# Patient Record
Sex: FEMALE | Race: WHITE | NOT HISPANIC OR LATINO | Employment: OTHER
[De-identification: names, ages, dates, MRNs, and addresses within clinical notes are randomized per-mention and may not be internally consistent; named-entity substitution may affect disease eponyms.]

---

## 2022-04-13 ENCOUNTER — TRANSCRIBE ORDERS (OUTPATIENT)
Dept: OTHER | Age: 62
End: 2022-04-13
Payer: COMMERCIAL

## 2022-04-13 DIAGNOSIS — R93.2 ABNORMAL CT OF LIVER: ICD-10-CM

## 2022-04-13 DIAGNOSIS — R74.8 ELEVATED ALKALINE PHOSPHATASE LEVEL: Primary | ICD-10-CM

## 2022-04-28 NOTE — TELEPHONE ENCOUNTER
RECORDS RECEIVED FROM: [facility]   Appt Date: 05.11.2022   NOTES STATUS DETAILS   OFFICE NOTE from referring provider [facility] 04.11.2022 Zulma Nina, PAC     OFFICE NOTES from other specialists     DISCHARGE SUMMARY from hospital     MEDICATION LIST [facility]    LIVER BIOSPY (IF APPLICABLE)      PATHOLOGY REPORTS      IMAGING     ENDOSCOPY (IF AVAILABLE)     COLONOSCOPY (IF AVAILABLE)     ULTRASOUND LIVER     CT OF ABDOMEN [facility]  02.08.2022 CT ABD AND PELVIS WITH IV CONTRAST       MRI OF LIVER [facility] 03.17.2022 MRI LIVER WITHOUT AND WITH CONTRAST     FIBROSCAN, US ELASTOGRAPHY, FIBROSIS SCAN, MR ELASTOGRAPHY     LABS     HEPATIC PANEL (LIVER PANEL) [facility] 03.31.2022    BASIC METABOLIC PANEL [facility] 07.14.2021   COMPLETE METABOLIC PANEL [facility] 02.09.2022   COMPLETE BLOOD COUNT (CBC) [facility] 02.09.2022   INTERNATIONAL NORMALIZED RATIO (INR) [facility] 04.26.2022    HEPATITIS C ANTIBODY     HEPATITIS C VIRAL LOAD/PCR     HEPATITIS C GENOTYPE     HEPATITIS B SURFACE ANTIGEN [facility] 02.09.2022   HEPATITIS B SURFACE ANTIBODY [facility] 03.31.2022   HEPATITIS B DNA QUANT LEVEL     HEPATITIS B CORE ANTIBODY       Action 04.28.2022 RM   Action Taken Called Davidre to get images pushed called [phone] spoke to rep who will be pushing image over.     Action 05.10.2022 RM 9:54A   Action Taken Called Scottie  At  [phone] spoke to Hawa who states she will be re pushing the images     Action 05.11.2022 RM   Action Taken Images received and uploaded to chart.

## 2022-05-11 ENCOUNTER — OFFICE VISIT (OUTPATIENT)
Dept: GASTROENTEROLOGY | Facility: CLINIC | Age: 62
End: 2022-05-11
Attending: PHYSICIAN ASSISTANT
Payer: COMMERCIAL

## 2022-05-11 ENCOUNTER — LAB (OUTPATIENT)
Dept: LAB | Facility: CLINIC | Age: 62
End: 2022-05-11
Payer: COMMERCIAL

## 2022-05-11 ENCOUNTER — PRE VISIT (OUTPATIENT)
Dept: GASTROENTEROLOGY | Facility: CLINIC | Age: 62
End: 2022-05-11
Payer: COMMERCIAL

## 2022-05-11 VITALS
OXYGEN SATURATION: 97 % | BODY MASS INDEX: 24.28 KG/M2 | DIASTOLIC BLOOD PRESSURE: 81 MMHG | RESPIRATION RATE: 14 BRPM | WEIGHT: 154.7 LBS | TEMPERATURE: 97.9 F | SYSTOLIC BLOOD PRESSURE: 139 MMHG | HEART RATE: 59 BPM | HEIGHT: 67 IN

## 2022-05-11 DIAGNOSIS — R74.8 ELEVATED ALKALINE PHOSPHATASE LEVEL: ICD-10-CM

## 2022-05-11 DIAGNOSIS — R93.2 ABNORMAL CT OF LIVER: ICD-10-CM

## 2022-05-11 LAB
ALBUMIN SERPL-MCNC: 3.8 G/DL (ref 3.4–5)
ALP SERPL-CCNC: 374 U/L (ref 40–150)
ALT SERPL W P-5'-P-CCNC: 35 U/L (ref 0–50)
ANION GAP SERPL CALCULATED.3IONS-SCNC: 7 MMOL/L (ref 3–14)
AST SERPL W P-5'-P-CCNC: 32 U/L (ref 0–45)
BILIRUB DIRECT SERPL-MCNC: 0.3 MG/DL (ref 0–0.2)
BILIRUB SERPL-MCNC: 0.6 MG/DL (ref 0.2–1.3)
BUN SERPL-MCNC: 21 MG/DL (ref 7–30)
CALCIUM SERPL-MCNC: 9.8 MG/DL (ref 8.5–10.1)
CHLORIDE BLD-SCNC: 103 MMOL/L (ref 94–109)
CO2 SERPL-SCNC: 30 MMOL/L (ref 20–32)
CREAT SERPL-MCNC: 0.8 MG/DL (ref 0.52–1.04)
ERYTHROCYTE [DISTWIDTH] IN BLOOD BY AUTOMATED COUNT: 16.4 % (ref 10–15)
GFR SERPL CREATININE-BSD FRML MDRD: 83 ML/MIN/1.73M2
GLUCOSE BLD-MCNC: 93 MG/DL (ref 70–99)
HCT VFR BLD AUTO: 40.9 % (ref 35–47)
HGB BLD-MCNC: 13.5 G/DL (ref 11.7–15.7)
IGM SERPL-MCNC: 69 MG/DL (ref 35–242)
INR PPP: 1.21 (ref 0.85–1.15)
MCH RBC QN AUTO: 33 PG (ref 26.5–33)
MCHC RBC AUTO-ENTMCNC: 33 G/DL (ref 31.5–36.5)
MCV RBC AUTO: 100 FL (ref 78–100)
NT-PROBNP SERPL-MCNC: 1084 PG/ML (ref 0–900)
PLATELET # BLD AUTO: 316 10E3/UL (ref 150–450)
POTASSIUM BLD-SCNC: 3.9 MMOL/L (ref 3.4–5.3)
PROT SERPL-MCNC: 8.1 G/DL (ref 6.8–8.8)
RBC # BLD AUTO: 4.09 10E6/UL (ref 3.8–5.2)
SODIUM SERPL-SCNC: 140 MMOL/L (ref 133–144)
WBC # BLD AUTO: 9 10E3/UL (ref 4–11)

## 2022-05-11 PROCEDURE — 99000 SPECIMEN HANDLING OFFICE-LAB: CPT | Performed by: PATHOLOGY

## 2022-05-11 PROCEDURE — 80053 COMPREHEN METABOLIC PANEL: CPT | Performed by: PATHOLOGY

## 2022-05-11 PROCEDURE — 85027 COMPLETE CBC AUTOMATED: CPT | Performed by: PATHOLOGY

## 2022-05-11 PROCEDURE — 83880 ASSAY OF NATRIURETIC PEPTIDE: CPT | Performed by: PATHOLOGY

## 2022-05-11 PROCEDURE — 82248 BILIRUBIN DIRECT: CPT | Performed by: PATHOLOGY

## 2022-05-11 PROCEDURE — 86381 MITOCHONDRIAL ANTIBODY EACH: CPT | Performed by: PATHOLOGY

## 2022-05-11 PROCEDURE — 82784 ASSAY IGA/IGD/IGG/IGM EACH: CPT | Mod: 90 | Performed by: PATHOLOGY

## 2022-05-11 PROCEDURE — 99205 OFFICE O/P NEW HI 60 MIN: CPT | Performed by: INTERNAL MEDICINE

## 2022-05-11 PROCEDURE — 36415 COLL VENOUS BLD VENIPUNCTURE: CPT | Performed by: PATHOLOGY

## 2022-05-11 PROCEDURE — 85610 PROTHROMBIN TIME: CPT | Performed by: PATHOLOGY

## 2022-05-11 PROCEDURE — G0463 HOSPITAL OUTPT CLINIC VISIT: HCPCS

## 2022-05-11 RX ORDER — LEVOTHYROXINE SODIUM 100 UG/1
100 TABLET ORAL DAILY
COMMUNITY
Start: 2020-08-04

## 2022-05-11 RX ORDER — ATORVASTATIN CALCIUM 40 MG/1
40 TABLET, FILM COATED ORAL DAILY
COMMUNITY
Start: 2022-04-14

## 2022-05-11 RX ORDER — LISINOPRIL 2.5 MG/1
2.5 TABLET ORAL DAILY
COMMUNITY
Start: 2022-02-18

## 2022-05-11 RX ORDER — MULTIVITAMIN,THERAPEUTIC
1 TABLET ORAL DAILY
COMMUNITY

## 2022-05-11 RX ORDER — FUROSEMIDE 20 MG
20 TABLET ORAL DAILY
COMMUNITY
Start: 2022-02-18 | End: 2022-08-10 | Stop reason: ALTCHOICE

## 2022-05-11 RX ORDER — ALPRAZOLAM 0.5 MG
1 TABLET ORAL AT BEDTIME
COMMUNITY
Start: 2022-05-03

## 2022-05-11 RX ORDER — METOPROLOL SUCCINATE 50 MG/1
50 TABLET, EXTENDED RELEASE ORAL DAILY
COMMUNITY
Start: 2022-02-18

## 2022-05-11 RX ORDER — WARFARIN SODIUM 5 MG/1
TABLET ORAL DAILY
COMMUNITY
Start: 2022-03-23

## 2022-05-11 RX ORDER — LORATADINE 10 MG/1
10 TABLET ORAL DAILY
COMMUNITY

## 2022-05-11 ASSESSMENT — PAIN SCALES - GENERAL: PAINLEVEL: MODERATE PAIN (5)

## 2022-05-11 NOTE — PROGRESS NOTES
[facility] Hepatology    New Patient Visit    Referring provider:  Zulma Nina      61 year old female    Chief complaint:  Abnormal liver function imaging    HPI:  Patient presents to clinic this morning with her  for further evaluation and management of abnormal liver function tests and abnormal liver imaging.  She reports that she has had abnormal liver function tests since around 2015, specifically with an elevated alkaline phosphatase.  The patient had a CT abdomen and pelvis in 02/2022 for right lower quadrant pain which showed an abnormally shaped liver.  MRI liver in 03/2022 had similar findings.  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  Laboratory evaluation has been done which was notable for a borderline-positive anti-mitochondrial antibody.  The patient has a thoracentesis scheduled soon for worsening right pleural effusion.      The patient is well today.  She has no symptoms related to liver disease.  She does report some right lower quadrant abdominal pain and right-sided lower back pain, both of which are worse with bending and resolve with rest.    The patient denies itch, jaundice, abdominal pain, lower extremity edema, lethargy or confusion.    No history of melena, hematemesis or hematochezia.    The patient reports mild shortness of breath when walking long distances on an incline.  She has normal exercise tolerance on the flat.  She denies chest pain, palpitations, cough or dizziness.    The patient denies fever, sweats or chills.  She is vaccinated and double boosted against COVID-19.    Weight has been stable.  Appetite is normal.    The patient does not use any herbal supplements or remedies.    The patient has not drank alcohol since 01/2022.  She denies any history of alcohol use disorder or DUIs.    The patient is an ex-smoker of 35 years.  She previously smoked 15 years at 1/4 pack of cigarettes per day.    Patient denies  any illicit drug use including marijuana, IM or IV drugs.    Medical hx Surgical hx   Past Medical History:   Diagnosis Date     Anxiety      Dyslipidemia      GERD (gastroesophageal reflux disease)      History of basal cell carcinoma 2011     Hodgkin lymphoma (H) 1978     HTN (hypertension)      Malignant neoplasm of left breast (H) 2011     Mitral regurgitation      Non-ischemic cardiomyopathy (H) 2017     Paroxysmal atrial fibrillation (H)       Past Surgical History:   Procedure Laterality Date     CHOLECYSTECTOMY       EP PACEMAKER  05/2021     MASTECTOMY Left 2011     SALPINGO-OOPHORECTOMY BILATERAL       TISSUE AORTIC VALVE REPLACEMENT  2017     TONSILLECTOMY  1965     TOTAL THYROIDECTOMY  2007          Medications  Current Outpatient Medications   Medication Sig Dispense Refill     ALPRAZolam (XANAX) 0.5 MG tablet Take 1 tablet by mouth At Bedtime       atorvastatin (LIPITOR) 40 MG tablet Take 40 mg by mouth daily       furosemide (LASIX) 20 MG tablet Take 20 mg by mouth daily       levothyroxine (SYNTHROID/LEVOTHROID) 100 MCG tablet Take 100 mcg by mouth daily       lisinopril (ZESTRIL) 2.5 MG tablet Take 2.5 mg by mouth daily       loratadine (CLARITIN) 10 MG tablet Take 10 mg by mouth daily       metoprolol succinate ER (TOPROL XL) 50 MG 24 hr tablet Take 50 mg by mouth daily       Multiple Vitamin (THERA-TABS) TABS Take 1 tablet by mouth daily       omeprazole (PRILOSEC) 20 MG DR capsule Take 20 mg by mouth 2 times daily       warfarin ANTICOAGULANT (COUMADIN) 5 MG tablet Take by mouth daily as directed by coumadin clinic         Allergies  Allergies   Allergen Reactions     Kiwi Anaphylaxis     Kiwi Extract Anaphylaxis     Morphine Hives     HALLUCINATE       Psyllium Difficulty breathing and Shortness Of Breath     Eyes water       Hydrocodone-Acetaminophen Nausea and Vomiting and Nausea     Sulfa Drugs Rash       Family hx Social hx   Family History   Problem Relation Age of Onset     Ovarian Cancer  "Mother      Prostate Cancer Father      Cerebrovascular Disease Father      Kidney Cancer Brother      Colon Cancer No family hx of      Liver Disease No family hx of       Social History     Tobacco Use     Smoking status: Former Smoker     Quit date: 1992     Years since quittin.3     Smokeless tobacco: Never Used   Substance Use Topics     Alcohol use: Not Currently     Drug use: Not Currently     Lives in [facility] with .  1 biological child (daughter) who is healthy.  Retired RN at [employer].     Review of systems  A 10-point review of systems was negative.    Examination  /81 (BP Location: Right arm, Patient Position: Sitting, Cuff Size: Adult Regular)   Pulse 59   Temp 97.9  F (36.6  C) (Oral)   Resp 14   Ht 1.702 m (5' 7.01\")   Wt 70.2 kg (154 lb 11.2 oz)   SpO2 97%   BMI 24.22 kg/m    Body mass index is 24.22 kg/m .    Gen- well, NAD, A+Ox3, normal color  Eye- EOMI  ENT- MMM, normal oropharynx, prominent JVD  Lym- no palpable lymphadenopathy  CVS- S1, S2 normal, systolic click, no added sounds, RRR  RS- reduced air entry R base, no crackles or wheeze  Abd- laparotomy scar, soft, non-tender, hepatomegaly, no ascites or splenomegaly, BS+  Extr- pulses good, no FLIP  MS- hands normal- no clubbing  Neuro- A+Ox3, no asterixis  Skin- no rash or jaundice  Psych- normal mood    Laboratory  3/31/2022  TB 0.8, ALT 33, AST 36, AlkPh 335    TP 0.8, Alb 4.4    Fe 120, TIBC 359, FeSa 33%, Ferritin 93.7    HAV Ab neg  HBV SAb pos    A1-AT phenotype Pi-sliding scale    AMA pos  GALA neg  ASMA neg    TTG Ab neg    2022  HBV SAg neg  HCV Ab neg    WCC 7, hgb 13.9, plt 278    11/3/2019  TB 0.6, ALT 25, AST 27, AlkPh 223    Radiology  CT chest with IV contrast 2022 reviewed  MRI liver 3/17/2022 personally reviewed- large R lobe, no mass, no spleen  JEANNIE May 2021 reviewed  CT C/A/P with IV contrast 2014 reviewed    Assessment  61-year-old female with history of radiation for Hodgkin's " lymphoma and non-ischemic cardiomyopathy who presents for further evaluation and management of abnormal liver imaging and abnormal liver function tests.      Differential diagnosis for abnormal liver imaging includes hepatic fibrosis related to prior radiation, hepatic congestion and possibly cirrhosis.  Will review imaging at upcoming Liver Tumor Conference.      Differential diagnosis for abnormal liver function tests includes primary biliary cholangitis and congestive hepatopathy.  Will repeat liver function tests today including serum IgM and antimitochondrial antibody.      We discussed that if imaging reviewed and laboratory evaluation are equivocal that the patient may need a liver biopsy.    Plan  1.  Check CMP, INR, CBC  2.  Check IgM, AMA, proBNP  3.  Review MRI liver at liver tumor conference  4.  Follow-up TBD    Tate Yo MD  Hepatology  [facility]    I spent 60 minutes on the date of the encounter doing chart review, history and exam, documentation and further activities as noted above.

## 2022-05-11 NOTE — NURSING NOTE
"Chief Complaint   Patient presents with     New Patient     Abnormal liver imaging     Vital signs:  Temp: 97.9  F (36.6  C) Temp src: Oral BP: 139/81 Pulse: 59   Resp: 14 SpO2: 97 %     Height: 170.2 cm (5' 7.01\") Weight: 70.2 kg (154 lb 11.2 oz)  Estimated body mass index is 24.22 kg/m  as calculated from the following:    Height as of this encounter: 1.702 m (5' 7.01\").    Weight as of this encounter: 70.2 kg (154 lb 11.2 oz).        Lindsey Quijano, [facility]  5/11/2022 9:49 AM      "

## 2022-05-13 LAB — MITOCHONDRIA M2 IGG SER-ACNC: 3.7 U/ML

## 2022-05-18 ENCOUNTER — TELEPHONE (OUTPATIENT)
Dept: GASTROENTEROLOGY | Facility: CLINIC | Age: 62
End: 2022-05-18
Payer: COMMERCIAL

## 2022-05-18 DIAGNOSIS — R74.8 ELEVATED ALKALINE PHOSPHATASE LEVEL: Primary | ICD-10-CM

## 2022-05-18 NOTE — TELEPHONE ENCOUNTER
Labs reviewed-  Elevated alk phos stable.  Elevated BNP.  Normal albumin.  Normal platelets.  No evidence of PBC.    MRI liver 3/17/2022 reviewed at liver tumor conference.  Dysmorphic liver.  No evidence of malignancy.  No evidence of portal hypertension or varices.  No splenomegaly.  Possibly some hepatic fibrosis.  Liver appearance is stable compared to CT A/P with IV contrast 4/1/2014.    Abnormal LFTs, elevated BNP and worsening pleural effusion most likely related to congestive hepatopathy/heart failure.  Recommend follow-up with cardiologist.    Will follow-up in 3 months.  If no improvement in LFTs despite improvement in cardiac function, consider liver biopsy.    Discussed with patient    Tate Yo MD  Hepatology

## 2022-05-29 ENCOUNTER — HEALTH MAINTENANCE LETTER (OUTPATIENT)
Age: 62
End: 2022-05-29

## 2022-06-27 ENCOUNTER — TELEPHONE (OUTPATIENT)
Dept: GASTROENTEROLOGY | Facility: CLINIC | Age: 62
End: 2022-06-27

## 2022-06-28 ENCOUNTER — TELEPHONE (OUTPATIENT)
Dept: GASTROENTEROLOGY | Facility: CLINIC | Age: 62
End: 2022-06-28

## 2022-06-28 NOTE — TELEPHONE ENCOUNTER
Returned call to Kandy and gave new diagnosis code for INR (abnormal results of liver function studies).     Margarita OLIVIER LPN  Hepatology Clinic     [facility]    Phone Message    May a detailed message be left on voicemail: yes     Reason for Call: Order(s): Other:   Reason for requested: Kandy from the [facility] is requesting a call back at [phone] to discuss why an INR was ordered for patient. She explained the reasoning on the order (Elevated alkaline phosphatase level) is not a valid reason for an INR. Thank you!  Date needed: ASAP  Provider name: Dr. Srinath Medina      Action Taken: Message routed to:  [facility]    Travel Screening: Not Applicable

## 2022-06-28 NOTE — TELEPHONE ENCOUNTER
Lab orders faxed.  Patient notified.    Margarita OLIVIER LPN  Hepatology Clinic     [facility]    Phone Message    May a detailed message be left on voicemail: yes     Reason for Call: Other: If labs are needed for 8/10/22 appt with Dr. Yo, patient would like to do them at [facility] near her home.  Please call patient and let her know if labs are needed prior to appt.     Action Taken: Message routed to:  [facility]ology [facility]    Travel Screening: Not Applicable

## 2022-08-10 ENCOUNTER — OFFICE VISIT (OUTPATIENT)
Dept: GASTROENTEROLOGY | Facility: CLINIC | Age: 62
End: 2022-08-10
Attending: INTERNAL MEDICINE
Payer: COMMERCIAL

## 2022-08-10 VITALS
OXYGEN SATURATION: 99 % | HEART RATE: 62 BPM | SYSTOLIC BLOOD PRESSURE: 128 MMHG | DIASTOLIC BLOOD PRESSURE: 77 MMHG | TEMPERATURE: 97.8 F | WEIGHT: 150.8 LBS | BODY MASS INDEX: 23.61 KG/M2

## 2022-08-10 DIAGNOSIS — K76.1 HEPATIC CONGESTION: Primary | ICD-10-CM

## 2022-08-10 PROCEDURE — 99213 OFFICE O/P EST LOW 20 MIN: CPT | Performed by: INTERNAL MEDICINE

## 2022-08-10 PROCEDURE — G0463 HOSPITAL OUTPT CLINIC VISIT: HCPCS

## 2022-08-10 RX ORDER — TORSEMIDE 20 MG/1
20 TABLET ORAL DAILY
COMMUNITY
Start: 2022-07-20

## 2022-08-10 ASSESSMENT — PAIN SCALES - GENERAL: PAINLEVEL: NO PAIN (0)

## 2022-08-10 NOTE — LETTER
8/10/2022         RE: Sonya Reynaga  [address]  [place] [address]        Dear Colleague,    Thank you for referring your patient, Sonya Reynaga, to the [facility]PAT[facility] [place]. Please see a copy of my visit note below.    [facility] Hepatology    Follow-up Visit    Follow-up visit for congestive hepatopathy    Subjective:  62 year old female    The patient comes to clinic this morning with her .  Since last visit, the patient had a thoracentesis for symptomatic pleural effusion.  Echocardiogram was done in 06/2022, which showed a reduced EF and right ventricular dysfunction.  The patient was then started on torsemide instead of furosemide by her local cardiologist for heart failure.  No hospital admissions since last visit.    The patient is well today.  She is feeling better and has more energy since both her thoracentesis and starting her diuretics.  Her lower extremity edema has resolved and she has dropped 3-4 pounds in weight.      The patient denies any recurrence of dyspnea, orthopnea or cough.  She also denies chest pain, palpitations or syncope.    The patient denies jaundice, ascites, lethargy or confusion.    The patient denies melena, hematemesis or hematochezia.    The patient's weight is slightly improved as detailed above.  Appetite is normal.    The patient does not drink alcohol.      Medical hx Surgical hx   Past Medical History:   Diagnosis Date     Anxiety      Dyslipidemia      GERD (gastroesophageal reflux disease)      History of basal cell carcinoma 2011     Hodgkin lymphoma (H) 1978     HTN (hypertension)      Malignant neoplasm of left breast (H) 2011     Mitral regurgitation      Non-ischemic cardiomyopathy (H) 2017     Paroxysmal atrial fibrillation (H)       Past Surgical History:   Procedure Laterality Date     CHOLECYSTECTOMY       EP PACEMAKER  05/2021     MASTECTOMY Left 2011     SALPINGO-OOPHORECTOMY BILATERAL       TISSUE AORTIC  VALVE REPLACEMENT  2017     TONSILLECTOMY  1965     TOTAL THYROIDECTOMY  2007          Medications  Prior to Admission medications    Medication Sig Start Date End Date Taking? Authorizing Provider   ALPRAZolam (XANAX) 0.5 MG tablet Take 1 tablet by mouth At Bedtime 5/3/22  Yes Reported, Patient   atorvastatin (LIPITOR) 40 MG tablet Take 40 mg by mouth daily 4/14/22  Yes Reported, Patient   levothyroxine (SYNTHROID/LEVOTHROID) 100 MCG tablet Take 100 mcg by mouth daily 8/4/20  Yes Reported, Patient   lisinopril (ZESTRIL) 2.5 MG tablet Take 2.5 mg by mouth daily 2/18/22  Yes Reported, Patient   loratadine (CLARITIN) 10 MG tablet Take 10 mg by mouth daily   Yes Reported, Patient   metoprolol succinate ER (TOPROL XL) 50 MG 24 hr tablet Take 50 mg by mouth daily 2/18/22  Yes Reported, Patient   Multiple Vitamin (THERA-TABS) TABS Take 1 tablet by mouth daily   Yes Reported, Patient   omeprazole (PRILOSEC) 20 MG DR capsule Take 20 mg by mouth 2 times daily 2/18/22  Yes Reported, Patient   warfarin ANTICOAGULANT (COUMADIN) 5 MG tablet Take by mouth daily as directed by coumadin clinic 3/23/22  Yes Reported, Patient   torsemide (DEMADEX) 20 MG tablet Take 20 mg by mouth daily 7/20/22   Reported, Patient       Allergies  Allergies   Allergen Reactions     Kiwi Anaphylaxis     Kiwi Extract Anaphylaxis     Morphine Hives     HALLUCINATE       Psyllium Difficulty breathing and Shortness Of Breath     Eyes water       Hydrocodone-Acetaminophen Nausea and Vomiting and Nausea     Sulfa Drugs Rash       Review of systems  A 10-point review of systems was negative    Examination  /77   Pulse 62   Temp 97.8  F (36.6  C) (Oral)   Wt 68.4 kg (150 lb 12.8 oz)   SpO2 99%   BMI 23.61 kg/m    Body mass index is 23.61 kg/m .    Gen- well, NAD, A+Ox3, normal color  CVS- RRR  RS- CTA  Abd- SNT, palpable liver edge, no ascites or splenomegaly, BS+  Extr- hands normal, no FLIP  Skin- no rash or jaundice  Neuro- no asterixis  Psych-  normal mood    Laboratory  Lab Results   Component Value Date     05/11/2022    POTASSIUM 3.9 05/11/2022    CHLORIDE 103 05/11/2022    CO2 30 05/11/2022    BUN 21 05/11/2022    CR 0.80 05/11/2022       Lab Results   Component Value Date    BILITOTAL 0.6 05/11/2022    ALT 35 05/11/2022    AST 32 05/11/2022    ALKPHOS 374 05/11/2022       Lab Results   Component Value Date    ALBUMIN 3.8 05/11/2022    PROTTOTAL 8.1 05/11/2022        Lab Results   Component Value Date    WBC 9.0 05/11/2022    HGB 13.5 05/11/2022     05/11/2022     05/11/2022       Lab Results   Component Value Date    INR 1.21 05/11/2022       Radiology  Nil new    Assessment  62-year-old female with a history of non-ischemic cardiomyopathy who presents for follow-up of abnormal liver function tests secondary to congestive hepatopathy.  Liver function tests stable.  Fluid overload symptoms have resolved on increased dose of diuretics.  No need for additional hepatology testing or follow-up unless liver function tests become significantly elevated:  the patient has regular follow-up scheduled with cardiology in [facility].    Plan  1.  Low Na diet  2.  Continue diuretics per cardiology  3.  Follow-up with PCP and cardiology    Tate Yo MD  Hepatology  [facility]

## 2022-08-10 NOTE — NURSING NOTE
Chief Complaint   Patient presents with     RECHECK     6 mo f/u       /77   Pulse 62   Temp 97.8  F (36.6  C) (Oral)   Wt 68.4 kg (150 lb 12.8 oz)   SpO2 99%   BMI 23.61 kg/m      Michele Garcia on 8/10/2022 at 9:22 AM

## 2022-08-10 NOTE — PROGRESS NOTES
[facility] Hepatology    Follow-up Visit    Follow-up visit for congestive hepatopathy    Subjective:  62 year old female    The patient comes to clinic this morning with her .  Since last visit, the patient had a thoracentesis for symptomatic pleural effusion.  Echocardiogram was done in 06/2022, which showed a reduced EF and right ventricular dysfunction.  The patient was then started on torsemide instead of furosemide by her local cardiologist for heart failure.  No hospital admissions since last visit.    The patient is well today.  She is feeling better and has more energy since both her thoracentesis and starting her diuretics.  Her lower extremity edema has resolved and she has dropped 3-4 pounds in weight.      The patient denies any recurrence of dyspnea, orthopnea or cough.  She also denies chest pain, palpitations or syncope.    The patient denies jaundice, ascites, lethargy or confusion.    The patient denies melena, hematemesis or hematochezia.    The patient's weight is slightly improved as detailed above.  Appetite is normal.    The patient does not drink alcohol.      Medical hx Surgical hx   Past Medical History:   Diagnosis Date     Anxiety      Dyslipidemia      GERD (gastroesophageal reflux disease)      History of basal cell carcinoma 2011     Hodgkin lymphoma (H) 1978     HTN (hypertension)      Malignant neoplasm of left breast (H) 2011     Mitral regurgitation      Non-ischemic cardiomyopathy (H) 2017     Paroxysmal atrial fibrillation (H)       Past Surgical History:   Procedure Laterality Date     CHOLECYSTECTOMY       EP PACEMAKER  05/2021     MASTECTOMY Left 2011     SALPINGO-OOPHORECTOMY BILATERAL       TISSUE AORTIC VALVE REPLACEMENT  2017     TONSILLECTOMY  1965     TOTAL THYROIDECTOMY  2007          Medications  Prior to Admission medications    Medication Sig Start Date End Date Taking? Authorizing Provider   ALPRAZolam (XANAX) 0.5 MG tablet Take 1 tablet by mouth At  Bedtime 5/3/22  Yes Reported, Patient   atorvastatin (LIPITOR) 40 MG tablet Take 40 mg by mouth daily 4/14/22  Yes Reported, Patient   levothyroxine (SYNTHROID/LEVOTHROID) 100 MCG tablet Take 100 mcg by mouth daily 8/4/20  Yes Reported, Patient   lisinopril (ZESTRIL) 2.5 MG tablet Take 2.5 mg by mouth daily 2/18/22  Yes Reported, Patient   loratadine (CLARITIN) 10 MG tablet Take 10 mg by mouth daily   Yes Reported, Patient   metoprolol succinate ER (TOPROL XL) 50 MG 24 hr tablet Take 50 mg by mouth daily 2/18/22  Yes Reported, Patient   Multiple Vitamin (THERA-TABS) TABS Take 1 tablet by mouth daily   Yes Reported, Patient   omeprazole (PRILOSEC) 20 MG DR capsule Take 20 mg by mouth 2 times daily 2/18/22  Yes Reported, Patient   warfarin ANTICOAGULANT (COUMADIN) 5 MG tablet Take by mouth daily as directed by coumadin clinic 3/23/22  Yes Reported, Patient   torsemide (DEMADEX) 20 MG tablet Take 20 mg by mouth daily 7/20/22   Reported, Patient       Allergies  Allergies   Allergen Reactions     Kiwi Anaphylaxis     Kiwi Extract Anaphylaxis     Morphine Hives     HALLUCINATE       Psyllium Difficulty breathing and Shortness Of Breath     Eyes water       Hydrocodone-Acetaminophen Nausea and Vomiting and Nausea     Sulfa Drugs Rash       Review of systems  A 10-point review of systems was negative    Examination  /77   Pulse 62   Temp 97.8  F (36.6  C) (Oral)   Wt 68.4 kg (150 lb 12.8 oz)   SpO2 99%   BMI 23.61 kg/m    Body mass index is 23.61 kg/m .    Gen- well, NAD, A+Ox3, normal color  CVS- RRR  RS- CTA  Abd- SNT, palpable liver edge, no ascites or splenomegaly, BS+  Extr- hands normal, no FLIP  Skin- no rash or jaundice  Neuro- no asterixis  Psych- normal mood    Laboratory  Lab Results   Component Value Date     05/11/2022    POTASSIUM 3.9 05/11/2022    CHLORIDE 103 05/11/2022    CO2 30 05/11/2022    BUN 21 05/11/2022    CR 0.80 05/11/2022       Lab Results   Component Value Date    BILITOTAL 0.6  05/11/2022    ALT 35 05/11/2022    AST 32 05/11/2022    ALKPHOS 374 05/11/2022       Lab Results   Component Value Date    ALBUMIN 3.8 05/11/2022    PROTTOTAL 8.1 05/11/2022        Lab Results   Component Value Date    WBC 9.0 05/11/2022    HGB 13.5 05/11/2022     05/11/2022     05/11/2022       Lab Results   Component Value Date    INR 1.21 05/11/2022       Radiology  Nil new    Assessment  62-year-old female with a history of non-ischemic cardiomyopathy who presents for follow-up of abnormal liver function tests secondary to congestive hepatopathy.  Liver function tests stable.  Fluid overload symptoms have resolved on increased dose of diuretics.  No need for additional hepatology testing or follow-up unless liver function tests become significantly elevated:  the patient has regular follow-up scheduled with cardiology in [facility].    Plan  1.  Low Na diet  2.  Continue diuretics per cardiology  3.  Follow-up with PCP and cardiology    Tate Yo MD  Hepatology  [facility]

## 2022-10-03 ENCOUNTER — HEALTH MAINTENANCE LETTER (OUTPATIENT)
Age: 62
End: 2022-10-03

## 2024-03-10 ENCOUNTER — HEALTH MAINTENANCE LETTER (OUTPATIENT)
Age: 64
End: 2024-03-10

## 2024-07-28 ENCOUNTER — HEALTH MAINTENANCE LETTER (OUTPATIENT)
Age: 64
End: 2024-07-28

## 2025-02-18 ENCOUNTER — MEDICAL CORRESPONDENCE (OUTPATIENT)
Dept: HEALTH INFORMATION MANAGEMENT | Facility: CLINIC | Age: 65
End: 2025-02-18
Payer: MEDICARE

## 2025-02-24 ENCOUNTER — TRANSCRIBE ORDERS (OUTPATIENT)
Dept: CALL CENTER | Age: 65
End: 2025-02-24
Payer: MEDICARE

## 2025-02-24 DIAGNOSIS — K74.60 CIRRHOSIS OF LIVER WITHOUT ASCITES, UNSPECIFIED HEPATIC CIRRHOSIS TYPE (H): Primary | ICD-10-CM

## 2025-03-11 DIAGNOSIS — I42.8 NON-ISCHEMIC CARDIOMYOPATHY (H): ICD-10-CM

## 2025-03-11 DIAGNOSIS — R94.5 ABNORMAL RESULTS OF LIVER FUNCTION STUDIES: Primary | ICD-10-CM

## 2025-03-16 ENCOUNTER — HEALTH MAINTENANCE LETTER (OUTPATIENT)
Age: 65
End: 2025-03-16

## 2025-03-17 DIAGNOSIS — R79.9 ABNORMAL FINDING OF BLOOD CHEMISTRY, UNSPECIFIED: ICD-10-CM

## 2025-03-17 DIAGNOSIS — F55.8 ABUSE OF OTHER NON-PSYCHOACTIVE SUBSTANCES: ICD-10-CM

## 2025-03-17 DIAGNOSIS — R94.5 ABNORMAL RESULTS OF LIVER FUNCTION STUDIES: Primary | ICD-10-CM

## 2025-03-18 NOTE — PROGRESS NOTES
[facility] Hepatology    New Patient Visit    Referring provider:  Moriah Poe  Chief complaint:  Hepatic Congestion    Assessment  64 year old female with PMHx of systolic dysfunction (40-45%), RV dysfunction, severe tricuspid regurgitation, moderate mitral valve regurgitation, severe aortic stenosis s/p bioprosthetic AVR (2017), SSS s/p pacemaker (last 7/2024), pAfib, PAD, HTN, HL and hypothyroidism. PMHx also includes: breast cancer s/p bilateral mastectomy and salpingo-oophorectomy, Hodgkin's lymphoma s/p chemotherapy and radiation     #. Congestive Hepatopathy   #. Prior radiation exposure to the liver  #. Metabolic dysfunction - PAD, HTN, HL  Patient previously followed with Dr. Yo.  patient's imaging from March 2022 was reviewed at liver tumor conference.  The liver was noted to be dysmorphic, but no evidence of portal hypertension or varices.  Possibly some hepatic fibrosis in the setting of radiation and congestion.  The patient's appearance of her liver was stable from 2014.  Given the patient's abnormal liver studies, elevated BNP and worsening pleural effusion the patient's abnormal alkaline phosphatase was thought to be mostly related to congestive hepatopathy/heart failure. Workup for PBC was negative - AMA 3.7. IgM 69 (normal).     Patient with severe tricuspid regurgitation, RV dysfunction and systolic dysfunction with an ejection fraction of 40 to 45%.  I suspect that these factors are contributing to the patient's congestive hepatopathy.  The patient has a history of a splenectomy thus I am unable to assess for splenomegaly.  The patient's albumin is within normal limits which suggest intact hepatic synthetic function.  The patient has normal platelet count which argues against portal hypertension.    For definitive assessment of the patient's degree of fibrosis in the setting of congestion transjugular liver biopsy with portal pressure measurements would be recommended.  Given  the patient has ongoing causes for congestion I am not sure this would be beneficial unless it would change her clinical course in terms of her tricuspid regurgitation management.  I have called Dr. Erwin Harden's office in [place] to discuss this further as this is the provider who would be doing her tricuspid repair; awaiting a response.     #. A1AT - SS  Patient has alpha 1 antitrypsin genotype SS.  This genotype have approximately 60% of normal AAT.  This is not typically associated with liver disease.    Plan  -- Follow up ALP isoenzymes and iron studies   -- Awaiting a response from Dr. Erwin Harden; if there is a need for a formal assessment of fibrosis versus cirrhosis we can perform a transjugular liver biopsy with portal pressure measurements.  This would need to be done at the [facility].  -- Management of cardiac conditions per cardiology team at [place]  -- Continue to work on lifestyle and dietary modifications to promote healthy weight.  Reduce carbohydrates, sugars and processed foods  -- Continue alcohol abstinence/avoidance as you are    RTC: Pending response from Dr. Erwin Willoughby MD (Lizzie)  Advanced & Transplant Hepatology  [facility]    I spent 60 minutes on this encounter performing the following: reviewing the patient's medical record (clinic visits, hospital records, lab results, imaging and procedural documentation), history taking, physical exam and documentation on the date of the encounter. I also spent part of the time in coordination of care and counseling.    HPI:    Patient presented with her  Santiago.    Patient can walk 3-4 blocks before she develops shortness of breath.  Patient with severe tricuspid regurgitation and is currently being evaluated for possible tricuspid clip.  Denies any chest pain.  She has mild left lower extremity swelling for which she is on diuretics.  She follows with cardiology.  She  attempts to be adherent to a low-sodium diet.    Denies any epistaxis, melena, gum bleeding, hematemesis or hematochezia.    No issues with slowness of thought or confusion.  No history of jaundice.    She is very mobile throughout the day.  She engages in housework, care of her grandchildren and walks.    She will occasionally get abdominal discomfort that is around her umbilical area.  This comes in the morning and resolves with bowel movements.    No alcohol in several years.  No history of alcohol overuse      Medical hx Surgical hx   Past Medical History:   Diagnosis Date    Anxiety     Dyslipidemia     GERD (gastroesophageal reflux disease)     History of basal cell carcinoma 2011    Hodgkin lymphoma (H) 1978    HTN (hypertension)     Malignant neoplasm of left breast (H) 2011    Mitral regurgitation     Non-ischemic cardiomyopathy (H) 2017    Paroxysmal atrial fibrillation (H)       Past Surgical History:   Procedure Laterality Date    CHOLECYSTECTOMY      EP PACEMAKER  05/2021    MASTECTOMY Left 2011    SALPINGO-OOPHORECTOMY BILATERAL      TISSUE AORTIC VALVE REPLACEMENT  2017    TONSILLECTOMY  1965    TOTAL THYROIDECTOMY  2007          Medications  Current Outpatient Medications   Medication Sig Dispense Refill    ALPRAZolam (XANAX) 0.5 MG tablet Take 1 tablet by mouth At Bedtime      atorvastatin (LIPITOR) 40 MG tablet Take 40 mg by mouth daily      ezetimibe (ZETIA) 10 MG tablet Take 10 mg by mouth daily.      levothyroxine (SYNTHROID/LEVOTHROID) 100 MCG tablet Take 100 mcg by mouth daily      lisinopril (ZESTRIL) 2.5 MG tablet Take 2.5 mg by mouth daily      metoprolol succinate ER (TOPROL XL) 50 MG 24 hr tablet Take 50 mg by mouth daily      Multiple Vitamin (THERA-TABS) TABS Take 1 tablet by mouth daily      omeprazole (PRILOSEC) 20 MG DR capsule Take 20 mg by mouth 2 times daily      torsemide (DEMADEX) 20 MG tablet Take 20 mg by mouth daily      warfarin ANTICOAGULANT (COUMADIN) 5 MG tablet Take by  mouth daily as directed by coumadin clinic         Allergies  Allergies   Allergen Reactions    Kiwi Anaphylaxis    Kiwi Extract Anaphylaxis    Morphine Hives     HALLUCINATE      Psyllium Difficulty breathing and Shortness Of Breath     Eyes water      Hydrocodone-Acetaminophen Nausea and Vomiting and Nausea    Sulfa Antibiotics Rash       Family hx Social hx   Family History   Problem Relation Age of Onset    Ovarian Cancer Mother     Prostate Cancer Father     Cerebrovascular Disease Father     Kidney Cancer Brother     Colon Cancer No family hx of     Liver Disease No family hx of       Social History     Tobacco Use    Smoking status: Former     Current packs/day: 0.00     Types: Cigarettes     Quit date: 1992     Years since quittin.2    Smokeless tobacco: Never   Substance Use Topics    Alcohol use: Not Currently    Drug use: Not Currently     - Alcohol: None in years   - Tobacco: Former     Review of systems  A 10-point review of systems was negative.    Examination  /79   Pulse 79   Temp 97.8  F (36.6  C) (Oral)   Wt 64.9 kg (143 lb)   SpO2 97%   BMI 22.39 kg/m     Body mass index is 22.39 kg/m .    Gen-NAD  Eye-no conjunctival icterus  CVS- RRR, III/VI holosystolic murmur   RS- CTA bilaterally except for mild crackles at right base  Abd-soft, nontender, nondistended.  No hepatomegaly  Extr- 2+ radial pulses bilaterally, no lower extremity edema bilaterally  MS- hands without clubbing  Skin- no  jaundice  Psych- normal mood    Laboratory  BMP  Recent Labs   Lab Test 22  1117      POTASSIUM 3.9   CHLORIDE 103   KELLI 9.8   CO2 30   BUN 21   CR 0.80   GLC 93     CBC  Recent Labs   Lab Test 25  0919 22  1117   WBC 6.8 9.0   RBC 3.80 4.09   HGB 10.6* 13.5   HCT 32.8* 40.9   MCV 86 100   MCH 27.9 33.0   MCHC 32.3 33.0   RDW 19.3* 16.4*    316     Liver Enzymes   Recent Labs   Lab Test 22  1117   PROTTOTAL 8.1   ALBUMIN 3.8   BILITOTAL 0.6   ALKPHOS 374*    AST 32   ALT 35      INR   INR   Date Value Ref Range Status   03/19/2025 1.88 (H) 0.85 - 1.15 Final         A1AT: homozygous for S mutation - SS phenotype  AMA 0.2 (borderline)   GALA 0.8  Smooth muscle Ab negative  Ceruloplasmin 43  Hep A total reactive  Hep B s Ab 551  Hep B s Ag non-reactive  Hep C Ab non-reactive    Radiology  CT Chest Abdomen Pelvis with IV Contrast 5/20/2024  1. Large right pleural effusion, similar to prior with adjacent compressive   atelectasis/consolidation.  Consider fluid sampling if clinically indicated.   2. Heterogeneity and slight nodularity of the liver suggestive of cirrhosis.   Mildly increased steatosis.  No definitive evidence for enhancing hepatic mass   given some limitation of single phase imaging.   3. Mild endometrial thickening versus uterine heterogeneity/mottling artifact.   Consider pelvic sonography for further assessment.  Findings suggestive of   pelvic congestion.   4. Fluid distends the esophagus to the level of the thoracic inlet.   Indeterminate etiology.  Could represent aspiration risk.   5. Please see above for additional findings as some may be clinically relevant.     TTE 2/11/2025   * The estimated ejection fraction is 40-45%.     * The right ventricle is mildly enlarged.     * Mildly reduced right ventricular systolic function.     * The left atrium is moderately enlarged.     * A 21 mm Carrillo PERIMOUNT bovine valve is present.     * There is a peak velocity of  176.45 cm/s, mean gradient of  6.82 mmHg,   calculated aortic valve area of  0.92 cm2 and an NDSI of   0.33.     * There is no perivalvular leak noted.     * Severe mitral annular calcification.     * There is moderate mitral regurgitation.     * There is severe tricuspid regurgitation.     * Estimated pulmonary arterial systolic pressure is 32 mmHg.     * The IVC is normal in size (< 2.1 cm), > 50% respiratory variance, RA   pressure normal at 3 mmHg.     * Prior study from 8/17/23. EF 45% and  TAVR with normal function, MAC   Mild   MR, and Severe TR - No change on side by side comparison.     Endoscopy

## 2025-03-19 ENCOUNTER — OFFICE VISIT (OUTPATIENT)
Dept: GASTROENTEROLOGY | Facility: CLINIC | Age: 65
End: 2025-03-19
Attending: INTERNAL MEDICINE
Payer: MEDICARE

## 2025-03-19 ENCOUNTER — LAB (OUTPATIENT)
Dept: LAB | Facility: CLINIC | Age: 65
End: 2025-03-19
Payer: COMMERCIAL

## 2025-03-19 VITALS
WEIGHT: 143 LBS | OXYGEN SATURATION: 97 % | HEART RATE: 79 BPM | TEMPERATURE: 97.8 F | DIASTOLIC BLOOD PRESSURE: 79 MMHG | SYSTOLIC BLOOD PRESSURE: 130 MMHG | BODY MASS INDEX: 22.39 KG/M2

## 2025-03-19 DIAGNOSIS — K74.60 CIRRHOSIS OF LIVER WITHOUT ASCITES, UNSPECIFIED HEPATIC CIRRHOSIS TYPE (H): ICD-10-CM

## 2025-03-19 DIAGNOSIS — F55.8 ABUSE OF OTHER NON-PSYCHOACTIVE SUBSTANCES: ICD-10-CM

## 2025-03-19 DIAGNOSIS — R79.9 ABNORMAL FINDING OF BLOOD CHEMISTRY, UNSPECIFIED: ICD-10-CM

## 2025-03-19 DIAGNOSIS — I42.8 NON-ISCHEMIC CARDIOMYOPATHY (H): ICD-10-CM

## 2025-03-19 DIAGNOSIS — R94.5 ABNORMAL RESULTS OF LIVER FUNCTION STUDIES: ICD-10-CM

## 2025-03-19 LAB
ALBUMIN SERPL BCG-MCNC: 4.5 G/DL (ref 3.5–5.2)
ALP SERPL-CCNC: 384 U/L (ref 40–150)
ALT SERPL W P-5'-P-CCNC: 36 U/L (ref 0–50)
ANION GAP SERPL CALCULATED.3IONS-SCNC: 12 MMOL/L (ref 7–15)
AST SERPL W P-5'-P-CCNC: 41 U/L (ref 0–45)
BILIRUB DIRECT SERPL-MCNC: 0.3 MG/DL (ref 0–0.3)
BILIRUB SERPL-MCNC: 0.5 MG/DL
BUN SERPL-MCNC: 26 MG/DL (ref 8–23)
CALCIUM SERPL-MCNC: 10.1 MG/DL (ref 8.8–10.4)
CHLORIDE SERPL-SCNC: 102 MMOL/L (ref 98–107)
CREAT SERPL-MCNC: 0.97 MG/DL (ref 0.51–0.95)
EGFRCR SERPLBLD CKD-EPI 2021: 65 ML/MIN/1.73M2
ERYTHROCYTE [DISTWIDTH] IN BLOOD BY AUTOMATED COUNT: 19.3 % (ref 10–15)
FERRITIN SERPL-MCNC: 25 NG/ML (ref 11–328)
GLUCOSE SERPL-MCNC: 70 MG/DL (ref 70–99)
HCO3 SERPL-SCNC: 25 MMOL/L (ref 22–29)
HCT VFR BLD AUTO: 32.8 % (ref 35–47)
HGB BLD-MCNC: 10.6 G/DL (ref 11.7–15.7)
INR PPP: 1.88 (ref 0.85–1.15)
IRON BINDING CAPACITY (ROCHE): 430 UG/DL (ref 240–430)
IRON SATN MFR SERPL: 14 % (ref 15–46)
IRON SERPL-MCNC: 60 UG/DL (ref 37–145)
MCH RBC QN AUTO: 27.9 PG (ref 26.5–33)
MCHC RBC AUTO-ENTMCNC: 32.3 G/DL (ref 31.5–36.5)
MCV RBC AUTO: 86 FL (ref 78–100)
PLATELET # BLD AUTO: 334 10E3/UL (ref 150–450)
POTASSIUM SERPL-SCNC: 4.8 MMOL/L (ref 3.4–5.3)
PROT SERPL-MCNC: 7.9 G/DL (ref 6.4–8.3)
RBC # BLD AUTO: 3.8 10E6/UL (ref 3.8–5.2)
SODIUM SERPL-SCNC: 139 MMOL/L (ref 135–145)
WBC # BLD AUTO: 6.8 10E3/UL (ref 4–11)

## 2025-03-19 PROCEDURE — G0463 HOSPITAL OUTPT CLINIC VISIT: HCPCS | Performed by: INTERNAL MEDICINE

## 2025-03-19 PROCEDURE — 82103 ALPHA-1-ANTITRYPSIN TOTAL: CPT | Mod: 90 | Performed by: PATHOLOGY

## 2025-03-19 PROCEDURE — 99417 PROLNG OP E/M EACH 15 MIN: CPT | Performed by: INTERNAL MEDICINE

## 2025-03-19 PROCEDURE — 99000 SPECIMEN HANDLING OFFICE-LAB: CPT | Performed by: PATHOLOGY

## 2025-03-19 PROCEDURE — 3075F SYST BP GE 130 - 139MM HG: CPT | Performed by: INTERNAL MEDICINE

## 2025-03-19 PROCEDURE — 85027 COMPLETE CBC AUTOMATED: CPT | Performed by: PATHOLOGY

## 2025-03-19 PROCEDURE — 99215 OFFICE O/P EST HI 40 MIN: CPT | Performed by: INTERNAL MEDICINE

## 2025-03-19 PROCEDURE — 36415 COLL VENOUS BLD VENIPUNCTURE: CPT | Performed by: PATHOLOGY

## 2025-03-19 PROCEDURE — 86381 MITOCHONDRIAL ANTIBODY EACH: CPT | Performed by: INTERNAL MEDICINE

## 2025-03-19 PROCEDURE — 84080 ASSAY ALKALINE PHOSPHATASES: CPT | Mod: 90 | Performed by: PATHOLOGY

## 2025-03-19 PROCEDURE — 84075 ASSAY ALKALINE PHOSPHATASE: CPT | Mod: 90 | Performed by: PATHOLOGY

## 2025-03-19 PROCEDURE — G0480 DRUG TEST DEF 1-7 CLASSES: HCPCS | Mod: 90 | Performed by: PATHOLOGY

## 2025-03-19 PROCEDURE — 3078F DIAST BP <80 MM HG: CPT | Performed by: INTERNAL MEDICINE

## 2025-03-19 PROCEDURE — 85610 PROTHROMBIN TIME: CPT | Performed by: PATHOLOGY

## 2025-03-19 PROCEDURE — 82104 ALPHA-1-ANTITRYPSIN PHENO: CPT | Mod: 90 | Performed by: PATHOLOGY

## 2025-03-19 RX ORDER — EZETIMIBE 10 MG/1
10 TABLET ORAL DAILY
COMMUNITY
Start: 2024-11-13 | End: 2025-11-13

## 2025-03-19 NOTE — LETTER
3/19/2025      Sonya Reynaga  [address]  [place] [address]      Dear Colleague,    Thank you for referring your patient, Sonya Reynaga, to the [facility] [place]. Please see a copy of my visit note below.    [facility] Hepatology    New Patient Visit    Referring provider:  Moriah Poe  Chief complaint:  Hepatic Congestion    Assessment  64 year old female with PMHx of systolic dysfunction (40-45%), RV dysfunction, severe tricuspid regurgitation, moderate mitral valve regurgitation, severe aortic stenosis s/p bioprosthetic AVR (2017), SSS s/p pacemaker (last 7/2024), pAfib, PAD, HTN, HL and hypothyroidism. PMHx also includes: breast cancer s/p bilateral mastectomy and salpingo-oophorectomy, Hodgkin's lymphoma s/p chemotherapy and radiation     #. Congestive Hepatopathy   #. Prior radiation exposure to the liver  #. Metabolic dysfunction - PAD, HTN, HL  Patient previously followed with Dr. Yo.  patient's imaging from March 2022 was reviewed at liver tumor conference.  The liver was noted to be dysmorphic, but no evidence of portal hypertension or varices.  Possibly some hepatic fibrosis in the setting of radiation and congestion.  The patient's appearance of her liver was stable from 2014.  Given the patient's abnormal liver studies, elevated BNP and worsening pleural effusion the patient's abnormal alkaline phosphatase was thought to be mostly related to congestive hepatopathy/heart failure. Workup for PBC was negative - AMA 3.7. IgM 69 (normal).     Patient with severe tricuspid regurgitation, RV dysfunction and systolic dysfunction with an ejection fraction of 40 to 45%.  I suspect that these factors are contributing to the patient's congestive hepatopathy.  The patient has a history of a splenectomy thus I am unable to assess for splenomegaly.  The patient's albumin is within normal limits which suggest intact hepatic synthetic function.  The patient has  normal platelet count which argues against portal hypertension.    For definitive assessment of the patient's degree of fibrosis in the setting of congestion transjugular liver biopsy with portal pressure measurements would be recommended.  Given the patient has ongoing causes for congestion I am not sure this would be beneficial unless it would change her clinical course in terms of her tricuspid regurgitation management.  I have called Dr. Erwin Harden's office in [place] to discuss this further as this is the provider who would be doing her tricuspid repair; awaiting a response.     #. A1AT - SS  Patient has alpha 1 antitrypsin genotype SS.  This genotype have approximately 60% of normal AAT.  This is not typically associated with liver disease.    Plan  -- Follow up ALP isoenzymes and iron studies   -- Awaiting a response from Dr. Erwin Harden; if there is a need for a formal assessment of fibrosis versus cirrhosis we can perform a transjugular liver biopsy with portal pressure measurements.  This would need to be done at the [facility].  -- Management of cardiac conditions per cardiology team at [place]  -- Continue to work on lifestyle and dietary modifications to promote healthy weight.  Reduce carbohydrates, sugars and processed foods  -- Continue alcohol abstinence/avoidance as you are    RTC: Pending response from Dr. Erwin Willoughby MD ([place]izzie)  Advanced & Transplant Hepatology  [facility]    I spent 60 minutes on this encounter performing the following: reviewing the patient's medical record (clinic visits, hospital records, lab results, imaging and procedural documentation), history taking, physical exam and documentation on the date of the encounter. I also spent part of the time in coordination of care and counseling.    HPI:    Patient presented with her  Santiago.    Patient can walk 3-4 blocks before she develops shortness of breath.   Patient with severe tricuspid regurgitation and is currently being evaluated for possible tricuspid clip.  Denies any chest pain.  She has mild left lower extremity swelling for which she is on diuretics.  She follows with cardiology.  She attempts to be adherent to a low-sodium diet.    Denies any epistaxis, melena, gum bleeding, hematemesis or hematochezia.    No issues with slowness of thought or confusion.  No history of jaundice.    She is very mobile throughout the day.  She engages in housework, care of her grandchildren and walks.    She will occasionally get abdominal discomfort that is around her umbilical area.  This comes in the morning and resolves with bowel movements.    No alcohol in several years.  No history of alcohol overuse      Medical hx Surgical hx   Past Medical History:   Diagnosis Date     Anxiety      Dyslipidemia      GERD (gastroesophageal reflux disease)      History of basal cell carcinoma 2011     Hodgkin lymphoma (H) 1978     HTN (hypertension)      Malignant neoplasm of left breast (H) 2011     Mitral regurgitation      Non-ischemic cardiomyopathy (H) 2017     Paroxysmal atrial fibrillation (H)       Past Surgical History:   Procedure Laterality Date     CHOLECYSTECTOMY       EP PACEMAKER  05/2021     MASTECTOMY Left 2011     SALPINGO-OOPHORECTOMY BILATERAL       TISSUE AORTIC VALVE REPLACEMENT  2017     TONSILLECTOMY  1965     TOTAL THYROIDECTOMY  2007          Medications  Current Outpatient Medications   Medication Sig Dispense Refill     ALPRAZolam (XANAX) 0.5 MG tablet Take 1 tablet by mouth At Bedtime       atorvastatin (LIPITOR) 40 MG tablet Take 40 mg by mouth daily       ezetimibe (ZETIA) 10 MG tablet Take 10 mg by mouth daily.       levothyroxine (SYNTHROID/LEVOTHROID) 100 MCG tablet Take 100 mcg by mouth daily       lisinopril (ZESTRIL) 2.5 MG tablet Take 2.5 mg by mouth daily       metoprolol succinate ER (TOPROL XL) 50 MG 24 hr tablet Take 50 mg by mouth daily        Multiple Vitamin (THERA-TABS) TABS Take 1 tablet by mouth daily       omeprazole (PRILOSEC) 20 MG DR capsule Take 20 mg by mouth 2 times daily       torsemide (DEMADEX) 20 MG tablet Take 20 mg by mouth daily       warfarin ANTICOAGULANT (COUMADIN) 5 MG tablet Take by mouth daily as directed by coumadin clinic         Allergies  Allergies   Allergen Reactions     Kiwi Anaphylaxis     Kiwi Extract Anaphylaxis     Morphine Hives     HALLUCINATE       Psyllium Difficulty breathing and Shortness Of Breath     Eyes water       Hydrocodone-Acetaminophen Nausea and Vomiting and Nausea     Sulfa Antibiotics Rash       Family hx Social hx   Family History   Problem Relation Age of Onset     Ovarian Cancer Mother      Prostate Cancer Father      Cerebrovascular Disease Father      Kidney Cancer Brother      Colon Cancer No family hx of      Liver Disease No family hx of       Social History     Tobacco Use     Smoking status: Former     Current packs/day: 0.00     Types: Cigarettes     Quit date: 1992     Years since quittin.2     Smokeless tobacco: Never   Substance Use Topics     Alcohol use: Not Currently     Drug use: Not Currently     - Alcohol: None in years   - Tobacco: Former     Review of systems  A 10-point review of systems was negative.    Examination  /79   Pulse 79   Temp 97.8  F (36.6  C) (Oral)   Wt 64.9 kg (143 lb)   SpO2 97%   BMI 22.39 kg/m     Body mass index is 22.39 kg/m .    Gen-NAD  Eye-no conjunctival icterus  CVS- RRR, III/VI holosystolic murmur   RS- CTA bilaterally except for mild crackles at right base  Abd-soft, nontender, nondistended.  No hepatomegaly  Extr- 2+ radial pulses bilaterally, no lower extremity edema bilaterally  MS- hands without clubbing  Skin- no  jaundice  Psych- normal mood    Laboratory  BMP  Recent Labs   Lab Test 22  1117      POTASSIUM 3.9   CHLORIDE 103   KELLI 9.8   CO2 30   BUN 21   CR 0.80   GLC 93     CBC  Recent Labs   Lab Test  03/19/25  0919 05/11/22  1117   WBC 6.8 9.0   RBC 3.80 4.09   HGB 10.6* 13.5   HCT 32.8* 40.9   MCV 86 100   MCH 27.9 33.0   MCHC 32.3 33.0   RDW 19.3* 16.4*    316     Liver Enzymes   Recent Labs   Lab Test 05/11/22  1117   PROTTOTAL 8.1   ALBUMIN 3.8   BILITOTAL 0.6   ALKPHOS 374*   AST 32   ALT 35      INR   INR   Date Value Ref Range Status   03/19/2025 1.88 (H) 0.85 - 1.15 Final         A1AT: homozygous for S mutation - SS phenotype  AMA 0.2 (borderline)   GALA 0.8  Smooth muscle Ab negative  Ceruloplasmin 43  Hep A total reactive  Hep B s Ab 551  Hep B s Ag non-reactive  Hep C Ab non-reactive    Radiology  CT Chest Abdomen Pelvis with IV Contrast 5/20/2024  1. Large right pleural effusion, similar to prior with adjacent compressive   atelectasis/consolidation.  Consider fluid sampling if clinically indicated.   2. Heterogeneity and slight nodularity of the liver suggestive of cirrhosis.   Mildly increased steatosis.  No definitive evidence for enhancing hepatic mass   given some limitation of single phase imaging.   3. Mild endometrial thickening versus uterine heterogeneity/mottling artifact.   Consider pelvic sonography for further assessment.  Findings suggestive of   pelvic congestion.   4. Fluid distends the esophagus to the level of the thoracic inlet.   Indeterminate etiology.  Could represent aspiration risk.   5. Please see above for additional findings as some may be clinically relevant.     TTE 2/11/2025   * The estimated ejection fraction is 40-45%.     * The right ventricle is mildly enlarged.     * Mildly reduced right ventricular systolic function.     * The left atrium is moderately enlarged.     * A 21 mm Carrillo PERIMOUNT bovine valve is present.     * There is a peak velocity of  176.45 cm/s, mean gradient of  6.82 mmHg,   calculated aortic valve area of  0.92 cm2 and an NDSI of   0.33.     * There is no perivalvular leak noted.     * Severe mitral annular calcification.     * There is  moderate mitral regurgitation.     * There is severe tricuspid regurgitation.     * Estimated pulmonary arterial systolic pressure is 32 mmHg.     * The IVC is normal in size (< 2.1 cm), > 50% respiratory variance, RA   pressure normal at 3 mmHg.     * Prior study from 8/17/23. EF 45% and TAVR with normal function, MAC   Mild   MR, and Severe TR - No change on side by side comparison.     Endoscopy         Again, thank you for allowing me to participate in the care of your patient.        Sincerely,        Bina Willoughby MD    Electronically signed

## 2025-03-20 ENCOUNTER — TELEPHONE (OUTPATIENT)
Dept: MULTI SPECIALTY CLINIC | Facility: CLINIC | Age: 65
End: 2025-03-20
Payer: MEDICARE

## 2025-03-20 DIAGNOSIS — E61.1 IRON DEFICIENCY: Primary | ICD-10-CM

## 2025-03-20 RX ORDER — FERROUS SULFATE 325(65) MG
325 TABLET ORAL
Qty: 90 TABLET | Refills: 3 | Status: SHIPPED | OUTPATIENT
Start: 2025-03-20

## 2025-03-20 NOTE — TELEPHONE ENCOUNTER
Talked with the patient's cardiologist.  At this time given the patient's clinical status there is no indication for pursuing IR guided transjugular liver biopsy with portal pressure measurements.    If the cardiologist has any concerns he will reach back out to me.  He has my cell phone number

## 2025-03-23 LAB
A1AT PHENOTYP SERPL-IMP: NORMAL
A1AT SERPL-MCNC: 133 MG/DL

## 2025-04-06 ENCOUNTER — HEALTH MAINTENANCE LETTER (OUTPATIENT)
Age: 65
End: 2025-04-06

## 2025-06-29 ENCOUNTER — HEALTH MAINTENANCE LETTER (OUTPATIENT)
Age: 65
End: 2025-06-29